# Patient Record
Sex: FEMALE | URBAN - METROPOLITAN AREA
[De-identification: names, ages, dates, MRNs, and addresses within clinical notes are randomized per-mention and may not be internally consistent; named-entity substitution may affect disease eponyms.]

---

## 2023-01-18 ENCOUNTER — OFFICE (OUTPATIENT)
Dept: URBAN - METROPOLITAN AREA CLINIC 122 | Facility: CLINIC | Age: 48
Setting detail: OPHTHALMOLOGY
End: 2023-01-18
Payer: COMMERCIAL

## 2023-01-18 DIAGNOSIS — H11.153: ICD-10-CM

## 2023-01-18 DIAGNOSIS — H16.223: ICD-10-CM

## 2023-01-18 DIAGNOSIS — H18.513: ICD-10-CM

## 2023-01-18 DIAGNOSIS — H52.7: ICD-10-CM

## 2023-01-18 DIAGNOSIS — H40.013: ICD-10-CM

## 2023-01-18 DIAGNOSIS — H52.4: ICD-10-CM

## 2023-01-18 PROCEDURE — 92015 DETERMINE REFRACTIVE STATE: CPT | Performed by: OPHTHALMOLOGY

## 2023-01-18 PROCEDURE — 92250 FUNDUS PHOTOGRAPHY W/I&R: CPT | Performed by: OPHTHALMOLOGY

## 2023-01-18 PROCEDURE — 92014 COMPRE OPH EXAM EST PT 1/>: CPT | Performed by: OPHTHALMOLOGY

## 2023-01-18 ASSESSMENT — PACHYMETRY
OS_CT_UM: 515
OS_CT_CORRECTION: 2
OD_CT_CORRECTION: 1
OD_CT_UM: 521

## 2023-01-18 ASSESSMENT — REFRACTION_CURRENTRX
OS_SPHERE: -1.25
OD_SPHERE: -1.00
OD_OVR_VA: 20/
OS_OVR_VA: 20/

## 2023-01-18 ASSESSMENT — REFRACTION_MANIFEST
OD_VA1: 20/20
OD_ADD: +1.50
OS_VA1: 20/20
OS_SPHERE: -1.75
OD_ADD: +1.50
OS_VA1: 20/20-2
OS_ADD: +1.50
OD_SPHERE: -1.25
OD_VA1: 20/20
OS_SPHERE: -1.75
OS_ADD: +1.50
OD_SPHERE: -1.00
OS_CYLINDER: SPH
OD_CYLINDER: SPH

## 2023-01-18 ASSESSMENT — TONOMETRY
OD_IOP_MMHG: 13
OS_IOP_MMHG: 13

## 2023-01-18 ASSESSMENT — CONFRONTATIONAL VISUAL FIELD TEST (CVF)
OS_FINDINGS: FULL
OD_FINDINGS: FULL

## 2023-01-18 ASSESSMENT — REFRACTION_AUTOREFRACTION
OD_SPHERE: -1.50
OS_AXIS: 84
OS_SPHERE: -1.50
OS_CYLINDER: -0.50

## 2023-01-18 ASSESSMENT — VISUAL ACUITY
OD_BCVA: 20/20-2
OS_BCVA: 20/20

## 2023-01-18 ASSESSMENT — SUPERFICIAL PUNCTATE KERATITIS (SPK)
OS_SPK: 1+
OD_SPK: T

## 2023-01-18 ASSESSMENT — SPHEQUIV_DERIVED: OS_SPHEQUIV: -1.75

## 2023-01-18 ASSESSMENT — CORNEAL DYSTROPHY - POSTERIOR
OD_POSTERIORDYSTROPHY: GUTTATA
OS_POSTERIORDYSTROPHY: GUTTATA

## 2023-04-25 ENCOUNTER — NON-APPOINTMENT (OUTPATIENT)
Age: 48
End: 2023-04-25

## 2023-04-25 PROBLEM — Z00.00 ENCOUNTER FOR PREVENTIVE HEALTH EXAMINATION: Status: ACTIVE | Noted: 2023-04-25

## 2023-05-08 DIAGNOSIS — E06.3 AUTOIMMUNE THYROIDITIS: ICD-10-CM

## 2023-05-09 ENCOUNTER — APPOINTMENT (OUTPATIENT)
Dept: HEART AND VASCULAR | Facility: CLINIC | Age: 48
End: 2023-05-09
Payer: COMMERCIAL

## 2023-05-09 VITALS
TEMPERATURE: 98.2 F | BODY MASS INDEX: 27.66 KG/M2 | SYSTOLIC BLOOD PRESSURE: 122 MMHG | HEIGHT: 63 IN | OXYGEN SATURATION: 100 % | HEART RATE: 83 BPM | DIASTOLIC BLOOD PRESSURE: 86 MMHG | WEIGHT: 156.13 LBS

## 2023-05-09 DIAGNOSIS — Z78.0 ASYMPTOMATIC MENOPAUSAL STATE: ICD-10-CM

## 2023-05-09 DIAGNOSIS — Z98.891 HISTORY OF UTERINE SCAR FROM PREVIOUS SURGERY: ICD-10-CM

## 2023-05-09 DIAGNOSIS — Z82.49 FAMILY HISTORY OF ISCHEMIC HEART DISEASE AND OTHER DISEASES OF THE CIRCULATORY SYSTEM: ICD-10-CM

## 2023-05-09 DIAGNOSIS — I51.7 CARDIOMEGALY: ICD-10-CM

## 2023-05-09 DIAGNOSIS — Z83.3 FAMILY HISTORY OF DIABETES MELLITUS: ICD-10-CM

## 2023-05-09 DIAGNOSIS — R06.02 SHORTNESS OF BREATH: ICD-10-CM

## 2023-05-09 DIAGNOSIS — Z80.1 FAMILY HISTORY OF MALIGNANT NEOPLASM OF TRACHEA, BRONCHUS AND LUNG: ICD-10-CM

## 2023-05-09 PROCEDURE — 99204 OFFICE O/P NEW MOD 45 MIN: CPT

## 2023-05-09 RX ORDER — LEVOTHYROXINE SODIUM 175 UG/1
175 TABLET ORAL
Refills: 0 | Status: ACTIVE | COMMUNITY
Start: 2023-05-08

## 2023-05-09 NOTE — CARDIOLOGY SUMMARY
[de-identified] : \par TTE 4/24/23: LVIDd of 3.9, LVEF of 57 %, normal LV, Severely dilated RV, Severely dilated RA ,borderline nml RV function, TAPSE 1.8 cm, nml atrial septum. mild MR, moderate to mild TR, PASP is 30mmhg

## 2023-05-09 NOTE — HISTORY OF PRESENT ILLNESS
[FreeTextEntry1] : Ms. Cherry is a 48 y/o female with a Pmhx of Hashimoto. She originally presented to [facility] on 2/16/23 with symptoms of shortness of breath is aggravated by moderate activity, with no relieving factors.  on 4/24/23 She had a TTE by Dr. Erlinda Moreland which showed an LVIDd of 3.9, LVEF of 57 %, normal LV, Severely dilated RV, Severely dilated RA ,borderline nml RV function, TAPSE 1.8 cm, nml atrial septum. mild MR, moderate to mild TR, PASP is 30mmhg. She presents to the clinic  to establish care for her HF. \par \par \par She is accompanied today by her . She endorses that she went for a regular check up and EKG done due to her age. States they noticed a RBBB and some abnormalities so she was referred for an echo which was noted to have RA+ RV  dilation. She states she has noticed some SOB at rest and with going up and down the stairs. However, does not notice the symptoms when she work out doing davin and strength training. She uses + pillow at night and endorses othopnea, denies PND. She averages 2 portioned controlled meals and averages 1liter of fluid a day. She denies CP, palpitations, syncope, LH/dizziness, orthopnea, PND, abdominal discomfort, and LE edema. \par \par \par \par \par

## 2023-05-09 NOTE — PHYSICAL EXAM
[Well Developed] : well developed [Well Nourished] : well nourished [Normal Venous Pressure] : normal venous pressure [Normal S1, S2] : normal S1, S2 [Clear Lung Fields] : clear lung fields [Soft] : abdomen soft [Non Tender] : non-tender [Normal Gait] : normal gait [No Edema] : no edema [No Rash] : no rash [No Skin Lesions] : no skin lesions [Moves all extremities] : moves all extremities [de-identified] : ~6 JVP cmH2O  [de-identified] : warm peripherally

## 2023-05-09 NOTE — ASSESSMENT
[FreeTextEntry1] : Ms. Cherry is a 46 y/o female with a Pmhx of Hashimoto. She originally presented to [facility] on 2/16/23 with symptoms of shortness of breath is aggravated by moderate activity, with no relieving factors.  on 4/24/23 She had a TTE by Dr. Erlinda Moreland which showed an LVIDd of 3.9, LVEF of 57 %, normal LV, Severely dilated RV, Severely dilated RA ,borderline nml RV function, TAPSE 1.8 cm, nml atrial septum. mild MR, moderate to mild TR, PASP is 30mmhg. She is euvolemic on exam and normotensive, based on age, symptoms, and isolated right side dilation it is important to rule-out thrombosis/ embolysis. The following recommendations have been made: \par \par RA/RV Dilation\par - Can take lasix 10 mg PRN for LE edema, weight gain of +2lbs in 1 day or +5lbs in one week \par - V/Q scan ordered, recommend completing within your earliest convenience. \par - Bilateral LE doppler ordered, Also recommend completing in your earliest convenience.\par - Take your BP and wgt daily, keep a log and bring it to your next visit \par - Maintain a Heart healthy diet, and limit fluids between 1.5L-2L daily\par - Follow up in clinic after above test have been completed \par - Labs to be completed after visit today to assess pro BNP, electrolytes, renal function, and D-dimer.\par \par Hasimoto's Thyroiditis \par - Continue Levothyroxine 175 mcg Qd as prescribed\par \par RTC on 7/13/23 for follow-up clinic with Dr. Lai.

## 2023-05-10 LAB
ALBUMIN SERPL ELPH-MCNC: 4.4 G/DL
ALP BLD-CCNC: 98 U/L
ALT SERPL-CCNC: 12 U/L
ANION GAP SERPL CALC-SCNC: 12 MMOL/L
AST SERPL-CCNC: 17 U/L
BILIRUB SERPL-MCNC: 0.5 MG/DL
BUN SERPL-MCNC: 11 MG/DL
CALCIUM SERPL-MCNC: 10 MG/DL
CHLORIDE SERPL-SCNC: 105 MMOL/L
CO2 SERPL-SCNC: 25 MMOL/L
CREAT SERPL-MCNC: 0.8 MG/DL
DEPRECATED D DIMER PPP IA-ACNC: <150 NG/ML DDU
EGFR: 92 ML/MIN/1.73M2
GLUCOSE SERPL-MCNC: 99 MG/DL
NT-PROBNP SERPL-MCNC: 113 PG/ML
POTASSIUM SERPL-SCNC: 4.4 MMOL/L
PROT SERPL-MCNC: 7.1 G/DL
SODIUM SERPL-SCNC: 142 MMOL/L

## 2023-05-23 ENCOUNTER — RESULT REVIEW (OUTPATIENT)
Age: 48
End: 2023-05-23

## 2023-06-14 ENCOUNTER — TRANSCRIPTION ENCOUNTER (OUTPATIENT)
Age: 48
End: 2023-06-14

## 2023-06-16 ENCOUNTER — RESULT REVIEW (OUTPATIENT)
Age: 48
End: 2023-06-16

## 2023-06-29 ENCOUNTER — APPOINTMENT (OUTPATIENT)
Dept: CARDIOTHORACIC SURGERY | Facility: CLINIC | Age: 48
End: 2023-06-29
Payer: COMMERCIAL

## 2023-06-29 ENCOUNTER — NON-APPOINTMENT (OUTPATIENT)
Age: 48
End: 2023-06-29

## 2023-06-29 VITALS
RESPIRATION RATE: 16 BRPM | SYSTOLIC BLOOD PRESSURE: 128 MMHG | WEIGHT: 155 LBS | HEIGHT: 63 IN | BODY MASS INDEX: 27.46 KG/M2 | DIASTOLIC BLOOD PRESSURE: 83 MMHG | HEART RATE: 82 BPM | TEMPERATURE: 97.8 F | OXYGEN SATURATION: 97 %

## 2023-06-29 DIAGNOSIS — Q26.3 PARTIAL ANOMALOUS PULMONARY VENOUS CONNECTION: ICD-10-CM

## 2023-06-29 DIAGNOSIS — Q21.16 SINUS VENOSUS ATRIAL SEPTAL DEFECT, UNSPECIFIED: ICD-10-CM

## 2023-06-29 DIAGNOSIS — Q26.2 TOTAL ANOMALOUS PULMONARY VENOUS CONNECTION: ICD-10-CM

## 2023-06-29 PROCEDURE — 99205 OFFICE O/P NEW HI 60 MIN: CPT

## 2023-06-29 NOTE — END OF VISIT
[Time Spent: ___ minutes] : I have spent [unfilled] minutes of time on the encounter. [>50% of the face to face encounter time was spent on counseling and/or coordination of care for ___] : Greater than 50% of the face to face encounter time was spent on counseling and/or coordination of care for [unfilled] [FreeTextEntry3] : I, KHALIF MOE , am scribing for and in the presence of MIKIE QUIJANO the following sections: History of present illness, past Medical/family/surgical/family/social history, review of systems, vital signs, physical exam and disposition.\par

## 2023-06-29 NOTE — ASSESSMENT
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  Shunt calculations, RV dilation, and symptoms all are consistent with a large left to right shunt.  This should certainly be repaired to improve her symptoms in the short term and in the long term to help avoid continued right sided dilation, development of arrhythmia and pulmonary hypertension, and to help avoid RV failure.  Fortunately there is no sign of important increase in pulmonary vascular resistance at this time and she has not had rhythm issues.\par \par while many ASD can be closed interventionally, there is no standard technique for device closure of this type of defect, so surgery remains the standard therapy.  I would plan sternotomy with bypass for baffle atrial septation such that the anomalous veins drain into the left atrium as they should.  Occasionally it is necessary to baffle the entire mouth of the SVC to the left atrium and transfer the SVC above the anomalous veins to the right atrial appendage to achieve unobstructed pathways.  This can occur if the veins are high (does not seem to be the case here) of the the SVC relatively small (possibly an issue here since there is persistent left SVC).  This second option is called the [place] procedure and has the same very low risk as a single baffle repair.\par \par The overall risk of serious complications here is low.  Reintervention is rarely needed.  Periop issues with bleeding, infection and arrhythmia can occur.  I would expect a 5 day hospitalization.  \par \par All of this was carefully reviewed with the patient and her questions answered.  Surgery likely in September.\par \par Plan: \par TTE @ [facility]\par PST(labs, xray, EKG)-->will be performed at [facility]\par ERP\par SDA

## 2023-06-29 NOTE — CONSULT LETTER
[Consult Letter:] : I had the pleasure of evaluating your patient, [unfilled]. [Please see my note below.] : Please see my note below. [Consult Closing:] : Thank you very much for allowing me to participate in the care of this patient.  If you have any questions, please do not hesitate to contact me. [Dear  ___] : Dear  [unfilled], [Sincerely,] : Sincerely, [FreeTextEntry2] : June 29, 2023\par \par Sandra Bellamy MD\par [facility]\par [address]\[place] [address] [FreeTextEntry1] : . [FreeTextEntry3] : Issac Parks MD\par \par Cardiothoracic Surgery and Pediatrics\par [facility]

## 2023-06-29 NOTE — DATA REVIEWED
[FreeTextEntry1] : echo/cath/CT/MRI:\par sinus venosus ASD \par partial anomalous pulmonary venous return of right upper to right svc atrial junction\par left svc to coronary sinus\par preserved systolic function\par mild to moderate TR (structurally normal valve)\par Qp:Qs > 3:1

## 2023-06-29 NOTE — HISTORY OF PRESENT ILLNESS
[FreeTextEntry1] : This 47 year old woman is referred for consultation regarding sinus venosus ASD which was recently diagnosed. She is accompanied by her .  She reports symptomatic, reports sob/ford when walking fast or on an incline. NYHA2.  She has history of Hashimoto hypothyroid,  but is otherwise healthy. She works as STEM .

## 2023-07-12 NOTE — PHYSICAL EXAM
[Well Developed] : well developed [Well Nourished] : well nourished [Normal Venous Pressure] : normal venous pressure [Normal S1, S2] : normal S1, S2 [Clear Lung Fields] : clear lung fields [Soft] : abdomen soft [Non Tender] : non-tender [Normal Gait] : normal gait [No Edema] : no edema [No Rash] : no rash [No Skin Lesions] : no skin lesions [Moves all extremities] : moves all extremities [de-identified] : warm peripherally  [de-identified] : ~6 JVP cmH2O

## 2023-07-12 NOTE — ASSESSMENT
[FreeTextEntry1] : Ms. Cherry is a 46 y/o female with a Pmhx of Hashimoto. She originally presented to [facility] on 2/16/23 with symptoms of shortness of breath is aggravated by moderate activity, with no relieving factors.  on 4/24/23 She had a TTE by Dr. Erlinda Moreland which showed an LVIDd of 3.9, LVEF of 57 %, normal LV, Severely dilated RV, Severely dilated RA ,borderline nml RV function, TAPSE 1.8 cm, nml atrial septum. mild MR, moderate to mild TR, PASP is 30mmhg. She is euvolemic on exam and normotensive, based on age, symptoms, and isolated right side dilation it is important to rule-out thrombosis/ embolysis. The following recommendations have been made: \par \par RA/RV Dilation\par - Can take lasix 10 mg PRN for LE edema, weight gain of +2lbs in 1 day or +5lbs in one week \par - V/Q scan ordered, recommend completing within your earliest convenience. \par - Bilateral LE doppler ordered, Also recommend completing in your earliest convenience.\par - Take your BP and wgt daily, keep a log and bring it to your next visit \par - Maintain a Heart healthy diet, and limit fluids between 1.5L-2L daily\par - Follow up in clinic after above test have been completed \par - Labs to be completed after visit today to assess pro BNP, electrolytes, renal function, and D-dimer.\par \par Hasimoto's Thyroiditis \par - Continue Levothyroxine 175 mcg Qd as prescribed\par \par RTC on 7/13/23 for follow-up clinic with Dr. Lai.

## 2023-07-12 NOTE — CARDIOLOGY SUMMARY
[de-identified] : \par TTE 4/24/23: LVIDd of 3.9, LVEF of 57 %, normal LV, Severely dilated RV, Severely dilated RA ,borderline nml RV function, TAPSE 1.8 cm, nml atrial septum. mild MR, moderate to mild TR, PASP is 30mmhg

## 2023-07-12 NOTE — HISTORY OF PRESENT ILLNESS
[FreeTextEntry1] : Ms. Cherry is a 48 y/o female with a Pmhx of Hashimoto. She originally presented to [facility] on 2/16/23 with symptoms of shortness of breath is aggravated by moderate activity, with no relieving factors.  on 4/24/23 She had a TTE by Dr. Erlinda Moreland which showed an LVIDd of 3.9, LVEF of 57 %, normal LV, Severely dilated RV, Severely dilated RA ,borderline nml RV function, TAPSE 1.8 cm, nml atrial septum. mild MR, moderate to mild TR, PASP is 30mmhg. She presents to the clinic  to establish care for her HF. \par \par \par She is accompanied today by her . She endorses that she went for a regular check up and EKG done due to her age. States they noticed a RBBB and some abnormalities so she was referred for an echo which was noted to have RA+ RV  dilation. She states she has noticed some SOB at rest and with going up and down the stairs. However, does not notice the symptoms when she work out doing davin and strength training. She uses + pillow at night and endorses othopnea, denies PND. She averages 2 portioned controlled meals and averages 1liter of fluid a day. She denies CP, palpitations, syncope, LH/dizziness, orthopnea, PND, abdominal discomfort, and LE edema. \par \par \par \par \par

## 2023-07-13 ENCOUNTER — APPOINTMENT (OUTPATIENT)
Dept: HEART AND VASCULAR | Facility: CLINIC | Age: 48
End: 2023-07-13
Payer: COMMERCIAL

## 2023-07-13 VITALS
SYSTOLIC BLOOD PRESSURE: 114 MMHG | DIASTOLIC BLOOD PRESSURE: 82 MMHG | BODY MASS INDEX: 27.82 KG/M2 | WEIGHT: 157 LBS | HEART RATE: 83 BPM | TEMPERATURE: 98 F | HEIGHT: 63 IN | OXYGEN SATURATION: 98 %

## 2023-07-13 PROCEDURE — 99214 OFFICE O/P EST MOD 30 MIN: CPT

## 2024-02-28 ENCOUNTER — OFFICE (OUTPATIENT)
Dept: URBAN - METROPOLITAN AREA CLINIC 122 | Facility: CLINIC | Age: 49
Setting detail: OPHTHALMOLOGY
End: 2024-02-28
Payer: COMMERCIAL

## 2024-02-28 DIAGNOSIS — H16.223: ICD-10-CM

## 2024-02-28 DIAGNOSIS — H40.013: ICD-10-CM

## 2024-02-28 DIAGNOSIS — H18.513: ICD-10-CM

## 2024-02-28 PROCEDURE — 92083 EXTENDED VISUAL FIELD XM: CPT | Performed by: OPHTHALMOLOGY

## 2024-02-28 PROCEDURE — 92014 COMPRE OPH EXAM EST PT 1/>: CPT | Performed by: OPHTHALMOLOGY

## 2024-02-28 PROCEDURE — 92133 CPTRZD OPH DX IMG PST SGM ON: CPT | Performed by: OPHTHALMOLOGY

## 2024-02-28 ASSESSMENT — REFRACTION_MANIFEST
OS_ADD: +1.50
OD_VA1: 20/20
OD_ADD: +1.50
OD_SPHERE: -1.25
OD_SPHERE: -1.25
OS_SPHERE: -1.25
OD_SPHERE: -1.00
OS_SPHERE: -1.75
OS_CYLINDER: SPH
OD_ADD: +1.50
OD_VA1: 20/20
OS_ADD: +1.50
OD_ADD: +1.50
OS_VA1: 20/20
OS_CYLINDER: -0.75
OS_ADD: +1.50
OS_SPHERE: -1.75
OS_AXIS: 100
OD_VA1: 20/20
OS_VA1: 20/20-2
OD_CYLINDER: SPH
OS_VA1: 20/20

## 2024-02-28 ASSESSMENT — REFRACTION_CURRENTRX
OS_OVR_VA: 20/
OD_OVR_VA: 20/
OS_SPHERE: -1.25
OD_SPHERE: -1.00

## 2024-02-28 ASSESSMENT — CORNEAL DYSTROPHY - POSTERIOR
OD_POSTERIORDYSTROPHY: GUTTATA
OS_POSTERIORDYSTROPHY: GUTTATA

## 2024-02-28 ASSESSMENT — CONFRONTATIONAL VISUAL FIELD TEST (CVF)
OD_FINDINGS: FULL
OS_FINDINGS: FULL

## 2024-02-28 ASSESSMENT — SPHEQUIV_DERIVED
OS_SPHEQUIV: -1.625
OS_SPHEQUIV: -1.625

## 2024-02-28 ASSESSMENT — REFRACTION_AUTOREFRACTION
OS_SPHERE: -1.25
OS_AXIS: 87
OD_SPHERE: -1.25
OS_CYLINDER: -0.75

## 2024-02-28 ASSESSMENT — SUPERFICIAL PUNCTATE KERATITIS (SPK)
OS_SPK: 1+
OD_SPK: T

## 2025-06-30 ENCOUNTER — OFFICE (OUTPATIENT)
Dept: URBAN - METROPOLITAN AREA CLINIC 122 | Facility: CLINIC | Age: 50
Setting detail: OPHTHALMOLOGY
End: 2025-06-30
Payer: COMMERCIAL

## 2025-06-30 DIAGNOSIS — H40.013: ICD-10-CM

## 2025-06-30 DIAGNOSIS — H11.153: ICD-10-CM

## 2025-06-30 DIAGNOSIS — H16.223: ICD-10-CM

## 2025-06-30 DIAGNOSIS — H52.4: ICD-10-CM

## 2025-06-30 DIAGNOSIS — H25.13: ICD-10-CM

## 2025-06-30 DIAGNOSIS — H18.513: ICD-10-CM

## 2025-06-30 PROCEDURE — 92015 DETERMINE REFRACTIVE STATE: CPT | Performed by: OPHTHALMOLOGY

## 2025-06-30 PROCEDURE — 92250 FUNDUS PHOTOGRAPHY W/I&R: CPT | Performed by: OPHTHALMOLOGY

## 2025-06-30 PROCEDURE — 92014 COMPRE OPH EXAM EST PT 1/>: CPT | Performed by: OPHTHALMOLOGY

## 2025-06-30 ASSESSMENT — REFRACTION_MANIFEST
OS_SPHERE: -1.50
OS_CYLINDER: -0.75
OD_ADD: +1.50
OS_ADD: +1.50
OS_VA1: 20/20
OS_AXIS: 100
OD_SPHERE: -1.25
OD_SPHERE: -1.25
OD_SPHERE: -1.00
OS_SPHERE: -1.25
OS_SPHERE: -1.75
OD_ADD: +1.50
OD_ADD: +1.50
OS_ADD: +1.50
OD_ADD: +1.50
OD_VA1: 20/20
OS_VA1: 20/20-2
OS_SPHERE: -1.75
OD_CYLINDER: SPH
OS_VA1: 20/20
OS_ADD: +1.50
OD_VA1: 20/20
OS_CYLINDER: SPH
OD_SPHERE: -1.25
OD_VA1: 20/20
OS_ADD: +1.50

## 2025-06-30 ASSESSMENT — PACHYMETRY
OS_CT_UM: 515
OD_CT_UM: 521
OD_CT_CORRECTION: 1
OS_CT_CORRECTION: 2

## 2025-06-30 ASSESSMENT — REFRACTION_CURRENTRX
OS_OVR_VA: 20/
OD_OVR_VA: 20/
OS_SPHERE: -1.25
OD_SPHERE: -1.00

## 2025-06-30 ASSESSMENT — CONFRONTATIONAL VISUAL FIELD TEST (CVF)
OS_FINDINGS: FULL
OD_FINDINGS: FULL

## 2025-06-30 ASSESSMENT — VISUAL ACUITY
OS_BCVA: 20/20
OD_BCVA: 20/20

## 2025-06-30 ASSESSMENT — TONOMETRY
OD_IOP_MMHG: 11
OS_IOP_MMHG: 15